# Patient Record
Sex: FEMALE | Race: WHITE | NOT HISPANIC OR LATINO | Employment: FULL TIME | URBAN - METROPOLITAN AREA
[De-identification: names, ages, dates, MRNs, and addresses within clinical notes are randomized per-mention and may not be internally consistent; named-entity substitution may affect disease eponyms.]

---

## 2020-01-13 ENCOUNTER — OFFICE VISIT (OUTPATIENT)
Dept: FAMILY MEDICINE CLINIC | Facility: CLINIC | Age: 30
End: 2020-01-13

## 2020-01-13 VITALS
WEIGHT: 146 LBS | HEIGHT: 63 IN | HEART RATE: 78 BPM | SYSTOLIC BLOOD PRESSURE: 118 MMHG | BODY MASS INDEX: 25.87 KG/M2 | TEMPERATURE: 98 F | DIASTOLIC BLOOD PRESSURE: 84 MMHG | RESPIRATION RATE: 18 BRPM

## 2020-01-13 DIAGNOSIS — M67.431 GANGLION CYST OF DORSUM OF RIGHT WRIST: ICD-10-CM

## 2020-01-13 DIAGNOSIS — R51.9 CHRONIC DAILY HEADACHE: Primary | ICD-10-CM

## 2020-01-13 PROCEDURE — 99204 OFFICE O/P NEW MOD 45 MIN: CPT | Performed by: FAMILY MEDICINE

## 2020-01-13 RX ORDER — PROPRANOLOL HYDROCHLORIDE 80 MG/1
80 CAPSULE, EXTENDED RELEASE ORAL DAILY
Qty: 30 CAPSULE | Refills: 2 | Status: SHIPPED | OUTPATIENT
Start: 2020-01-13 | End: 2020-02-13 | Stop reason: SDUPTHER

## 2020-01-13 NOTE — PROGRESS NOTES
Subjective   Nannette Thompson is a 29 y.o. female.     History of Present Illness     She complains of daily headaches for the last few months  She has tried various diets without help  Using tylenol and ibuprofen on a consistent basis  CALDWELL is always back of her head but can be entire head, radiates towards the front  Pain is an aching tightness, not a sharp pain  medicine does dull this but then it returns  Nothing makes this better nor worse  HA is just there all the time  Glasses are 6 months old, she is on computers all the time at work      She has a ganglion cyst that was removed one year ago but it is back and she would like to see some one else  Saw Dr. Barroso for this at [facility] last year, he did surgery but the cyst has returned  Since this is back she would like a second opinion  Is is somewhat sore but more unsightly than anything      The following portions of the patient's history were reviewed and updated as appropriate: allergies, current medications, past family history, past medical history, past social history, past surgical history and problem list.    Review of Systems   Constitutional: Negative.  Negative for fatigue.   Eyes: Negative.  Negative for photophobia and visual disturbance.   Respiratory: Negative.  Negative for shortness of breath.    Cardiovascular: Negative.  Negative for chest pain and palpitations.   Gastrointestinal: Negative.  Negative for nausea and vomiting.   Musculoskeletal: Negative.    Skin: Negative.    Neurological: Positive for headaches.   Psychiatric/Behavioral: Negative.  Negative for dysphoric mood. The patient is not nervous/anxious.    All other systems reviewed and are negative.      Objective   Physical Exam   Constitutional: She is oriented to person, place, and time. She appears well-developed and well-nourished. No distress.   HENT:   Head: Normocephalic and atraumatic.   Right Ear: Tympanic membrane, external ear and ear canal normal.   Left Ear: Tympanic  membrane, external ear and ear canal normal.   Nose: Nose normal.   Mouth/Throat: Uvula is midline and oropharynx is clear and moist.   Eyes: Pupils are equal, round, and reactive to light. Conjunctivae and EOM are normal.   Neck: Normal range of motion. Neck supple. No thyromegaly present.   Cardiovascular: Normal rate, regular rhythm and normal heart sounds.   No murmur heard.  Pulmonary/Chest: Effort normal and breath sounds normal. No respiratory distress.   Abdominal: Soft. Bowel sounds are normal. She exhibits no distension and no mass. There is no tenderness.   Lymphadenopathy:     She has no cervical adenopathy.   Neurological: She is alert and oriented to person, place, and time.   Skin: Skin is warm and dry.   Psychiatric: She has a normal mood and affect. Her behavior is normal. Judgment and thought content normal.   Nursing note and vitals reviewed.      Assessment/Plan   Nannette was seen today for establish care.    Diagnoses and all orders for this visit:    Chronic daily headache  -     MRI Brain Without Contrast; Future  -     propranolol LA (INDERAL LA) 80 MG 24 hr capsule; Take 1 capsule by mouth Daily.    Ganglion cyst of dorsum of right wrist  -     Ambulatory Referral to Hand Surgery    new onset daily HA for the last 3 months.  Will check MRI.  Will treat with inderal LA 80 once a day.  She will keep HA diary and f/u in one month to review.  Discussed multiple options for this and discussed other treatment modalities including chiropractor, massage, PT or neuro evaluation.  Will review in one month and will discuss MRI when it is completed    She is still bothered by the ganglion cyst, would like to get second opinion on this, referral made to [facility].

## 2020-01-14 ENCOUNTER — HOSPITAL ENCOUNTER (OUTPATIENT)
Dept: MRI IMAGING | Facility: HOSPITAL | Age: 30
Discharge: HOME | End: 2020-01-14
Admitting: FAMILY MEDICINE

## 2020-01-14 DIAGNOSIS — R51.9 CHRONIC DAILY HEADACHE: ICD-10-CM

## 2020-01-14 PROCEDURE — 70551 MRI BRAIN STEM W/O DYE: CPT

## 2020-02-13 ENCOUNTER — TELEPHONE (OUTPATIENT)
Dept: FAMILY MEDICINE CLINIC | Facility: CLINIC | Age: 30
End: 2020-02-13

## 2020-02-13 DIAGNOSIS — R51.9 CHRONIC DAILY HEADACHE: ICD-10-CM

## 2020-02-13 RX ORDER — PROPRANOLOL HYDROCHLORIDE 120 MG/1
120 CAPSULE, EXTENDED RELEASE ORAL DAILY
Qty: 30 CAPSULE | Refills: 0 | Status: SHIPPED | OUTPATIENT
Start: 2020-02-13 | End: 2020-02-19 | Stop reason: SDUPTHER

## 2020-02-13 NOTE — TELEPHONE ENCOUNTER
Pt's  because pt is stating that the BP medication (propranolol LA (INDERAL LA) 80 MG 24 hr capsule) she is taking is not helping the headaches     Would like to know if stronger prescription could be called in     [facility] [address]  Ph: [phone]  Fax: [phone]

## 2020-02-19 ENCOUNTER — OFFICE VISIT (OUTPATIENT)
Dept: FAMILY MEDICINE CLINIC | Facility: CLINIC | Age: 30
End: 2020-02-19

## 2020-02-19 VITALS
HEART RATE: 66 BPM | RESPIRATION RATE: 16 BRPM | BODY MASS INDEX: 27.11 KG/M2 | SYSTOLIC BLOOD PRESSURE: 118 MMHG | DIASTOLIC BLOOD PRESSURE: 72 MMHG | TEMPERATURE: 98.7 F | HEIGHT: 63 IN | WEIGHT: 153 LBS

## 2020-02-19 DIAGNOSIS — R51.9 CHRONIC DAILY HEADACHE: Primary | ICD-10-CM

## 2020-02-19 PROCEDURE — 99213 OFFICE O/P EST LOW 20 MIN: CPT | Performed by: FAMILY MEDICINE

## 2020-02-19 RX ORDER — SUMATRIPTAN 100 MG/1
TABLET, FILM COATED ORAL
Qty: 9 TABLET | Refills: 2 | Status: SHIPPED | OUTPATIENT
Start: 2020-02-19 | End: 2020-11-16

## 2020-02-19 RX ORDER — PROPRANOLOL HYDROCHLORIDE 120 MG/1
120 CAPSULE, EXTENDED RELEASE ORAL DAILY
Qty: 30 CAPSULE | Refills: 5 | Status: SHIPPED | OUTPATIENT
Start: 2020-02-19 | End: 2020-09-28 | Stop reason: SDUPTHER

## 2020-02-19 NOTE — PROGRESS NOTES
Subjective   Nannette Thompson is a 29 y.o. female.     History of Present Illness     Migraines are doing better at this time on the inderal  120 was better than the 80  Has only been on the 120 for about 1 week but has noted a nice im,provement in HA  HAs she does have are less intense    Reviewed normal MRI with pt, no worrisome findings noted      Review of Systems   Constitutional: Negative.        Objective   Physical Exam   Constitutional: She appears well-developed and well-nourished. No distress.   Cardiovascular: Normal rate, regular rhythm and normal heart sounds.   Pulmonary/Chest: Effort normal and breath sounds normal.   Psychiatric: She has a normal mood and affect. Her behavior is normal. Judgment and thought content normal.   Nursing note and vitals reviewed.      Assessment/Plan   Nannette was seen today for follow-up.    Diagnoses and all orders for this visit:    Chronic daily headache  -     SUMAtriptan (IMITREX) 100 MG tablet; Take one tablet at onset of headache. May repeat dose one time in 2 hours if headache not relieved.  -     propranolol LA (INDERAL LA) 120 MG 24 hr capsule; Take 1 capsule by mouth Daily.    we have seen a nice decrease in HA frequency with the inderal.  120 is better than the 80.  Ok for OTC tylenol, ibuprofen or exedrin migraine as needed and will give imitrex for severe HA in the future.  Recheck in 6 months or sooner with any problems. Pt agrees

## 2020-03-05 ENCOUNTER — TELEPHONE (OUTPATIENT)
Dept: FAMILY MEDICINE CLINIC | Facility: CLINIC | Age: 30
End: 2020-03-05

## 2020-03-05 RX ORDER — OSELTAMIVIR PHOSPHATE 75 MG/1
75 CAPSULE ORAL DAILY
Qty: 10 CAPSULE | Refills: 0 | Status: SHIPPED | OUTPATIENT
Start: 2020-03-05 | End: 2020-03-15

## 2020-03-05 NOTE — TELEPHONE ENCOUNTER
PT CALLED AND STATED THAT HER SON TESTED POSITIVE FOR THE FLU TODAY.  PT IS REQUESTING TAMILFLU.  SONS ONSET WAS LAST EVENING 3/4/20    PT CALL BACK [phone]

## 2020-09-28 ENCOUNTER — TELEPHONE (OUTPATIENT)
Dept: FAMILY MEDICINE CLINIC | Facility: CLINIC | Age: 30
End: 2020-09-28

## 2020-09-28 DIAGNOSIS — R51.9 CHRONIC DAILY HEADACHE: ICD-10-CM

## 2020-09-28 RX ORDER — PROPRANOLOL HYDROCHLORIDE 120 MG/1
120 CAPSULE, EXTENDED RELEASE ORAL DAILY
Qty: 30 CAPSULE | Refills: 0 | Status: SHIPPED | OUTPATIENT
Start: 2020-09-28 | End: 2020-10-12

## 2020-09-28 RX ORDER — PROPRANOLOL HYDROCHLORIDE 120 MG/1
120 CAPSULE, EXTENDED RELEASE ORAL DAILY
Qty: 30 CAPSULE | Refills: 0 | Status: SHIPPED | OUTPATIENT
Start: 2020-09-28 | End: 2020-09-28 | Stop reason: SDUPTHER

## 2020-09-28 RX ORDER — PROPRANOLOL HYDROCHLORIDE 120 MG/1
CAPSULE, EXTENDED RELEASE ORAL
Qty: 30 CAPSULE | Refills: 0 | OUTPATIENT
Start: 2020-09-28

## 2020-09-28 NOTE — TELEPHONE ENCOUNTER
----- Message from Nannette Thompson sent at 9/28/2020  3:02 PM EDT -----  Regarding: Prescription Question  Contact: [phone]  Hello,   I am on my last pill of propranolol today and am out of refills.   I made an appointment with you on Monday.   Is it possible to get a small refill just to get me through to Monday?   Thank you!

## 2020-10-12 ENCOUNTER — OFFICE VISIT (OUTPATIENT)
Dept: FAMILY MEDICINE CLINIC | Facility: CLINIC | Age: 30
End: 2020-10-12

## 2020-10-12 VITALS
DIASTOLIC BLOOD PRESSURE: 68 MMHG | BODY MASS INDEX: 27.46 KG/M2 | RESPIRATION RATE: 18 BRPM | TEMPERATURE: 97.7 F | WEIGHT: 155 LBS | HEIGHT: 63 IN | HEART RATE: 74 BPM | SYSTOLIC BLOOD PRESSURE: 110 MMHG

## 2020-10-12 DIAGNOSIS — G43.009 MIGRAINE WITHOUT AURA AND WITHOUT STATUS MIGRAINOSUS, NOT INTRACTABLE: Primary | ICD-10-CM

## 2020-10-12 DIAGNOSIS — R51.9 CHRONIC DAILY HEADACHE: ICD-10-CM

## 2020-10-12 PROCEDURE — 99214 OFFICE O/P EST MOD 30 MIN: CPT | Performed by: FAMILY MEDICINE

## 2020-10-12 NOTE — PROGRESS NOTES
Subjective   Nannette Thompson is a 29 y.o. female.     History of Present Illness     She notes her HA are getting worse again  Happening more often and intensity is getting worse  She has been on inderal for a while but it is just not working any more  This is now happening most days and interfering with life  HA are frontal and back of head  do not rodiate  No aura noted with HA  imitrex did not work for her migraines and she does not think this every worked well  She simply felt hot while taking the imitrex and did not like that sensation    She would like something new to help prevent HAs and to take when they occur.    The following portions of the patient's history were reviewed and updated as appropriate: allergies, current medications, past family history, past medical history, past social history, past surgical history and problem list.    Review of Systems   Constitutional: Negative.    Eyes: Negative.  Negative for visual disturbance.   Respiratory: Negative.  Negative for shortness of breath.    Cardiovascular: Negative.  Negative for chest pain.   Gastrointestinal: Negative.  Negative for constipation, diarrhea, nausea and vomiting.   Musculoskeletal: Negative.    Skin: Negative.    Neurological: Positive for headaches.   Psychiatric/Behavioral: Negative.  Negative for dysphoric mood. The patient is not nervous/anxious.    All other systems reviewed and are negative.      Objective   Physical Exam  Vitals signs and nursing note reviewed.   Constitutional:       General: She is not in acute distress.     Appearance: Normal appearance. She is well-developed.   Eyes:      Extraocular Movements: Extraocular movements intact.      Conjunctiva/sclera: Conjunctivae normal.   Cardiovascular:      Rate and Rhythm: Normal rate and regular rhythm.      Heart sounds: Normal heart sounds.   Pulmonary:      Effort: Pulmonary effort is normal.      Breath sounds: Normal breath sounds.   Neurological:      Mental  Status: She is alert and oriented to person, place, and time.   Psychiatric:         Mood and Affect: Mood normal.         Behavior: Behavior normal.         Thought Content: Thought content normal.         Judgment: Judgment normal.         Assessment/Plan   Nannette was seen today for daily headaches.    Diagnoses and all orders for this visit:    Migraine without aura and without status migrainosus, not intractable    Chronic daily headache    HA worse, more numerous, more frequent  Sample aimovig (as a preventative medicine) with instructions and ubrelvy 100 mg to try for abortive.   Will stop propranolol (discussed tachycardia that can sometimes occur with cessation of beta blockers, and she will call with any issues)  Will f/u in one month

## 2020-11-16 ENCOUNTER — OFFICE VISIT (OUTPATIENT)
Dept: FAMILY MEDICINE CLINIC | Facility: CLINIC | Age: 30
End: 2020-11-16

## 2020-11-16 VITALS
HEART RATE: 76 BPM | WEIGHT: 155 LBS | BODY MASS INDEX: 27.46 KG/M2 | RESPIRATION RATE: 18 BRPM | TEMPERATURE: 97.8 F | SYSTOLIC BLOOD PRESSURE: 114 MMHG | DIASTOLIC BLOOD PRESSURE: 72 MMHG | HEIGHT: 63 IN

## 2020-11-16 DIAGNOSIS — G43.009 MIGRAINE WITHOUT AURA AND WITHOUT STATUS MIGRAINOSUS, NOT INTRACTABLE: Primary | ICD-10-CM

## 2020-11-16 DIAGNOSIS — R63.5 WEIGHT GAIN: ICD-10-CM

## 2020-11-16 PROCEDURE — 99213 OFFICE O/P EST LOW 20 MIN: CPT | Performed by: FAMILY MEDICINE

## 2020-11-16 RX ORDER — ERENUMAB-AOOE 140 MG/ML
140 INJECTION, SOLUTION SUBCUTANEOUS
Qty: 1 ML | Refills: 5 | Status: SHIPPED | OUTPATIENT
Start: 2020-11-16 | End: 2021-05-19 | Stop reason: SDUPTHER

## 2020-11-16 RX ORDER — PHENTERMINE HYDROCHLORIDE 37.5 MG/1
37.5 TABLET ORAL
Qty: 30 TABLET | Refills: 1 | Status: SHIPPED | OUTPATIENT
Start: 2020-11-16 | End: 2021-04-20

## 2020-11-16 NOTE — PROGRESS NOTES
Subjective   Nannette Thompson is a 30 y.o. female.     History of Present Illness     She had tried aimovig and really liked this medicine  It has greatly helped her migraines  ubrelvy helped when it was used  Has not had to use any medicine the last 3 weeks though!!!      She is staying in the  and her BMI was needing to be 22 or less  She would like referral for weight loss  [employer]  She is exercising but just cannot lose weight    The following portions of the patient's history were reviewed and updated as appropriate: allergies, current medications, past family history, past medical history, past social history, past surgical history and problem list.    Review of Systems   Constitutional: Negative.    HENT: Negative.    Eyes: Negative.    Respiratory: Negative.  Negative for shortness of breath.    Cardiovascular: Negative.  Negative for chest pain.   Gastrointestinal: Negative.    Musculoskeletal: Negative.    Skin: Negative.    Neurological: Negative.    Psychiatric/Behavioral: Negative.  Negative for dysphoric mood. The patient is not nervous/anxious.    All other systems reviewed and are negative.      Objective   Physical Exam  Vitals signs and nursing note reviewed.   Constitutional:       General: She is not in acute distress.     Appearance: Normal appearance. She is well-developed.   Cardiovascular:      Rate and Rhythm: Normal rate and regular rhythm.      Heart sounds: Normal heart sounds.   Pulmonary:      Effort: Pulmonary effort is normal.      Breath sounds: Normal breath sounds.   Neurological:      Mental Status: She is alert and oriented to person, place, and time.   Psychiatric:         Mood and Affect: Mood normal.         Behavior: Behavior normal.         Thought Content: Thought content normal.         Judgment: Judgment normal.         Assessment/Plan   Diagnoses and all orders for this visit:    1. Migraine without aura and without status migrainosus,  not intractable (Primary)  -     Erenumab-aooe (Aimovig) 140 MG/ML prefilled syringe; Inject 1 mL under the skin into the appropriate area as directed Every 30 (Thirty) Days.  Dispense: 1 mL; Refill: 5  -     ubrogepant (ubrogepant) 100 MG tablet; 1 at onset of migraine, can repeat in 2 hours if needed  Dispense: 10 tablet; Refill: 5    2. Weight gain  -     phentermine (ADIPEX-P) 37.5 MG tablet; Take 1 tablet by mouth Every Morning Before Breakfast.  Dispense: 30 tablet; Refill: 1    aimovig working great for her migraines, amazing control  Will continue this    Ok to try phentermine for weight.  She notes her [employer] weight rules are nonsensical to me as she is consider overweight and needs a BMI of 22!

## 2020-11-24 ENCOUNTER — HOSPITAL ENCOUNTER (OUTPATIENT)
Dept: NUTRITION | Facility: HOSPITAL | Age: 30
Setting detail: RECURRING SERIES
Discharge: HOME | End: 2020-11-24

## 2020-11-24 VITALS — HEIGHT: 63 IN | BODY MASS INDEX: 27.46 KG/M2 | WEIGHT: 155 LBS

## 2020-11-24 PROCEDURE — 97802 MEDICAL NUTRITION INDIV IN: CPT | Performed by: DIETITIAN, REGISTERED

## 2020-11-24 NOTE — CONSULTS
"Adult Outpatient Nutrition  Assessment/PES    Patient Name:  Nannette Thompson  YOB: 1990  MRN: [id number]    Assessment Date:  11/24/2020    Telehealth nutrition consult, 60 minutes. This medical referred consult was provided as a telephone call, tele-health or e-visit, as patient is unable to attend an in-office appointment due to the COVID-19 crisis. Consent for treatment was given verbally.    Comments:  Patient present for nutrition counseling related to weight loss. Patient has a desire to lose weight to meet  regulations. States she must be able to pass various physical fitness tests (2 mile run, push ups, sit ups, deadlifts, etc) and maintain a BMI of \"22 or 23\". Patient is frustrated as she has been making weight loss attempts without success. Reports adding in more exercise, but feels this only causes her to gain weight because she \"gains muscle very quickly\". Patient has done the ketogenic diet and lost weight but found she could not sustain exercise. Reports she does try and limit carbohydrates because her son is T1DM. Currently patient skips breakfast most days, eats out for lunch, and has a dinner at home. Drinks water and has cut back to 1 energy drink per day. Her exercise routine is running 1-3 times/week and some strength exercises. Her lifestyle is otherwise sedentary. Patient wants to obtain information today on strategies for weight loss. Current stated weight is 70.3kg (155 lbs). Patient has no barriers to learning. Health literacy assessment not completed today.    The instructional process includes the plate method, meal planning, portions, food record keeping, and exercise. Estimated energy needs for weight loss is 1,300 calories per day. Patient has never tracked calories and is open to trying this. RD advised a regular eating pattern and the benefits of not skipping meals. We looked at a 1,300 calorie meal plan and discussed how to create meals using food groups " "and serving sizes. Patient plans to pack ahead more to try and include breakfast more regularly, and cut back on her eating out for lunch. RD suggested patient keep items at work to reduce amount of prep work (leave oatmeal packets, protein bars, fresh fruit, salad kits, etc at work), and patient is open to this idea. RD also demonstrated ADA and AHA websites to patient and will send this to help patient with finding healthy meal ideas. Overall patient is receptive to the information and willing to try suggestions provided. Will address macronutrients, additional meal planning, and exercise in more detail at the follow up appointment.     Consent given to e-mail materials, including [facility] Weight Loss Toolkit, 1,300 kcal meal plan and sample menu, and supporting nutrition education materials.     Goals:  1. Follow 1,300 calorie plan/track calories.   2. Lose weight, 1+ kg per month.     Total of 50 minutes spent with patient on nutrition counseling. Education based on Academy of Dietetics and Nutrition guidelines. Patient was provided with RD's contact information. Follow up visit is scheduled for 12/14 at 2:00 p.m. Thank you for this referral.    General Info     Row Name 11/24/20 1414       Today's Session    Person(s) attending today's session  Patient     Services Used Today?  No       General Information    How Well Do You Speak English?  very well    Do You Speak a Language Other Than English at Home?  no    Preferred Language  English    Are you able to read and write English?  Yes    Lives With  child(francisco), dependent;spouse    Is patient pregnant?  no        Physical Findings     Row Name 11/24/20 1420          Physical Findings    Overall Physical Appearance  overweight         Anthropometrics     Row Name 11/24/20 1420          Anthropometrics    Height  160 cm (63\")     Weight  70.3 kg (155 lb)        Ideal Body Weight (IBW)    Ideal Body Weight (IBW) (kg)  52.72     % Ideal Body Weight "  133.37        Body Mass Index (BMI)    BMI (kg/m2)  27.51         Nutritional Info/Activity     Row Name 11/24/20 1800       Nutritional Information    Have you had weight changes?  No    What is your desired body weight?  -- BMI of 22-23    Have you tried to lose weight before?  Yes    List programs tried, date, and success  More exercise/weight lifting - no success; keto - lost weight but no energy for exercise    What is your motivation to lose weight?  Pass  regulations    Supplemental Drinks/Foods/Additives  None    History of eating disorder?  No    What cultural diet influences are important for you to follow?  None    Do you have difficulty chewing food?  No    Functional Status  able to prepare meals;able to purchase food;ambulatory    How often during the day do you find yourself snacking?  0-1 time/day (not often)    How often do you eat out and where?  daily for lunch, 1 time/week for dinner    How many times do you drink milk per day?  0    How many times do you eat fruit per day?  0    How many times do you eat vegetables per day?  2    How many times do you drink juice per day?  0    How many times do you eat candy/chocolates per day?  0    How many times do you eat baked goods per day?  0    How many times do you eat desserts per day?  0    How many times do you eat ice cream per day?  0    How many times do you eat snack foods per day?  0    How many diet sodas do you drink per day?  0    How many regular sodas do you drink per day?  0    How many times do you eat ethnic food per day?  0    How many times do you drink alcohol per day?  0    How many times do you have caffeine per day?  1    How many servings of artificial sweetner do you have per day?  0    How many meals do you eat each day?  2    How many snacks do you eat each day?  0    What is the biggest challenge you have with your diet?  Knowledge;Other (comment) Not losing weight despite diet attempts    Enter everything you can  "remember eating in the last 24 hours (1 day)  Breakfast: Diet energy drink; Lunch: Syriac food (takout); Dinner: Meat, vegetable       Eating Environment    Eating environment  Family;Work       Physical Activity    Are you currently involved in an activity/exercise program?   Yes    Describe physical activity  Running 2-3 times/week; Some strength (push-ups, sit-ups)    How many minutes do you spend on exercise each day?  0 0-30        Home Nutrition Report     Row Name 11/24/20 1420          Home Nutrition Report    Supplemental Drinks/Foods/Additives  None         Estimated/Assessed Needs     Row Name 11/24/20 1420          Calculation Measurements    Weight Used For Calculations  70.3 kg (155 lb)     Height  160 cm (63\")        Estimated/Assessed Needs    Additional Documentation  Dalton-St. Jeor Equation (Group)        Dalton-St. Jeor Equation    RMR (Dalton-St. Jeor Equation)  1392.205     Dalton-St. Jeor Activity Factors  1.2     Activity Factors (Dalton-St. Jeor)  1670.646                 Problem/Interventions:  Problem 1     Row Name 11/24/20 1429          Nutrition Diagnoses Problem 1    Problem 1  Overweight/Obesity     Etiology (related to)  Factors Affecting Nutrition     Food Habit/Preferences  Other skipped meal, fast food daily, possible over consumption of calories     Signs/Symptoms (evidenced by)  BMI     BMI  25 - 29.9                 Intervention Goal     Row Name 11/24/20 1429          Intervention Goal    General  Meet nutritional needs for age/condition     PO  Meet estimated needs     Weight  Appropriate weight loss           Nutrition Prescription     Row Name 11/24/20 1430          Nutrition Prescription PO    PO Prescription  Begin/change diet     Begin/Change Diet to  Regular     Fluid Consistency  Thin     New PO Prescription Ordered?  No, recommended         Education/Evaluation     Row Name 11/24/20 1431          Education    Education  Education topics;Provided education " regarding;Advised regarding habits/behavior     Provided education regarding  Nutrition for age;Diet rationale     Education Topics  Basic nutrition;Calorie counting;Gradual weight loss     Kcal/Day  1300 Kcal/day     Advised Regarding Habits/Behavior  Eating pattern;Food choices;Meal planning        Monitor/Evaluation    Monitor  Per protocol     Education Follow-up  Other (comment) 12/14 at 2:00 p.m.           Electronically signed by:  Areli Sun RD  11/24/20 14:34 EST

## 2020-12-14 ENCOUNTER — APPOINTMENT (OUTPATIENT)
Dept: NUTRITION | Facility: HOSPITAL | Age: 30
End: 2020-12-14

## 2021-01-04 ENCOUNTER — HOSPITAL ENCOUNTER (OUTPATIENT)
Dept: NUTRITION | Facility: HOSPITAL | Age: 31
Setting detail: RECURRING SERIES
Discharge: HOME | End: 2021-01-04

## 2021-01-04 NOTE — PROGRESS NOTES
Adult Outpatient Nutrition  Assessment/PES    Patient Name:  Nannette Thompson  YOB: 1990  MRN: [id number]    Assessment Date:  1/4/2021    Telehealth nutrition consult, 30 minutes. This medical referred consult was provided as a telephone call, tele-health or e-visit, as patient is unable to attend an in-office appointment due to the COVID-19 crisis. Consent for treatment was given verbally.    Comments: Patient present for nutrition follow up appointment related to weight loss. Patient is trying to lose weight to meet her  regulations and pass the PT test. Patient does not report a current weight as she feels it is fluctuating too much to know if she has had any weight changes. States it has fluctuated from 145 to 153lbs in 24 hours. Since the initial nutrition appointment, patient says she has been more mindful about her calorie intake. She is not using an abhi to track, but is reading labels to help make switches to lower calorie items, and feels she is closer to 1,300 calories per day. Patient's son was also just diagnosed with hypercholesterolemia so she has been trying to cut back on higher fat and cholesterol foods. She hopes this will help her lose weight as well. She feels her biggest success has been cooking more at home. Patient was eating out for lunch most days but now says she has hardly eaten out or used door dash since our initial visit. Patient feels her challenges have been a busy schedule leading to poor appetite and lack of exercise, and consistency with her healthier food choices. On days when her schedule is busy she may not eat a meal until dinner and then snack later in the evening. RD suggested incorporating a protein shake or smoothie earlier in the day to prevent 1 large meal at night. We also discussed creating a weekly plan/schedule and using phone alarms or reminders for her water consumption. Because of patient's large weight fluctuations, RD questions salt  "and fluid intake. Patient admits she \"adds salt to everything\" and some days may not drink any water. RD advised cutting back on the salt intake and adequate hydration to prevent the larger fluctuations in water weight. Overall patient states she \"knows what to do\" and needs to work on more consistency with her meal pattern and food choices, and adding exercise back in (she has a PT test coming up). Denies any questions or concerns today. RD encouraged patient with her goals she has set.     Goal completion:  1. Follow 1,300 calorie plan/track calories: 75%   2. Lose weight, 1+ kg per month: 0% Patient reports no significant weight changes.     Total of 25 minutes spent counseling with patient. She is encouraged to contact RD as needed. Thank you again for this referral.     General Info     Row Name 01/04/21 1107       Today's Session    Person(s) attending today's session  Patient     Services Used Today?  No       General Information    How Well Do You Speak English?  very well    Do You Speak a Language Other Than English at Home?  no    Preferred Language  English    Are you able to read and write English?  Yes    Lives With  spouse;child(francisco), dependent    Is patient pregnant?  no        Nutritional Info/Activity     Row Name 01/04/21 1103       Nutritional Information    Have you had weight changes?  Yes    Describe weight changes  Weight fluctuations - most recent 145-153 pounds    What is the biggest challenge you have with your diet?  Other (comment) consistency    Enter everything you can remember eating in the last 24 hours (1 day)  Breakfast: Skip; Lunch: Salad; Dinner: Bowl of pasta       Eating Environment    Eating environment  Family;Work       Physical Activity    Are you currently involved in an activity/exercise program?   No        Electronically signed by:  Areli Sun RD  01/04/21 11:05 EST  "

## 2021-04-20 ENCOUNTER — OFFICE VISIT (OUTPATIENT)
Dept: FAMILY MEDICINE CLINIC | Facility: CLINIC | Age: 31
End: 2021-04-20

## 2021-04-20 VITALS
DIASTOLIC BLOOD PRESSURE: 82 MMHG | SYSTOLIC BLOOD PRESSURE: 120 MMHG | RESPIRATION RATE: 18 BRPM | TEMPERATURE: 99.1 F | HEIGHT: 63 IN | BODY MASS INDEX: 24.8 KG/M2 | WEIGHT: 140 LBS | HEART RATE: 76 BPM

## 2021-04-20 DIAGNOSIS — N92.1 MENOMETRORRHAGIA: Primary | ICD-10-CM

## 2021-04-20 PROCEDURE — 99213 OFFICE O/P EST LOW 20 MIN: CPT | Performed by: FAMILY MEDICINE

## 2021-04-20 NOTE — PROGRESS NOTES
Subjective   Nannette Thompson is a 30 y.o. female.     History of Present Illness     She has had irregular cycles this month  In the past had always been normal  He had 3 breaks in the bleeding but otherwise has continued to bleed 5 days and then stop  She is bleeding right now  No abdominal pain, no cramping  This has never happened before and has only been present this month    In the past when she tried OCP she has had a lot of depression and suicidalitry      Review of Systems   Genitourinary: Positive for menstrual problem and vaginal bleeding.       Objective   Physical Exam  Vitals and nursing note reviewed.   Constitutional:       General: She is not in acute distress.     Appearance: Normal appearance. She is well-developed.   Cardiovascular:      Rate and Rhythm: Normal rate and regular rhythm.      Heart sounds: Normal heart sounds.   Pulmonary:      Effort: Pulmonary effort is normal.      Breath sounds: Normal breath sounds.   Neurological:      Mental Status: She is alert and oriented to person, place, and time.   Psychiatric:         Mood and Affect: Mood normal.         Behavior: Behavior normal.         Thought Content: Thought content normal.         Judgment: Judgment normal.         Assessment/Plan   Diagnoses and all orders for this visit:    1. Menometrorrhagia (Primary)  -     CBC & Differential  -     Comprehensive Metabolic Panel  -     TSH+Free T4  -     FSH & LH  -     Estrogens, Total  -     Progesterone    will check hormones and f/u INB.  this is first month for this irregularity in her cycles.  She has had very bad reactions to OCP in the past, will avoid these but if bleeding continues and no cause found.  Pt aware she will need to see gyn for work up.

## 2021-04-22 ENCOUNTER — TELEPHONE (OUTPATIENT)
Dept: FAMILY MEDICINE CLINIC | Facility: CLINIC | Age: 31
End: 2021-04-22

## 2021-04-22 NOTE — TELEPHONE ENCOUNTER
Caller: Nannette Thompson    Relationship to patient: Self    Best call back number: [phone]     Patient is needing: PATIENT REQUESTING CALL BACK TO GO OVER RECENT LAB RESULTS.

## 2021-04-26 LAB
ALBUMIN SERPL-MCNC: 4.5 G/DL (ref 3.9–5)
ALBUMIN/GLOB SERPL: 1.9 {RATIO} (ref 1.2–2.2)
ALP SERPL-CCNC: 53 IU/L (ref 39–117)
ALT SERPL-CCNC: 11 IU/L (ref 0–32)
AST SERPL-CCNC: 13 IU/L (ref 0–40)
BASOPHILS # BLD AUTO: 0 X10E3/UL (ref 0–0.2)
BASOPHILS NFR BLD AUTO: 1 %
BILIRUB SERPL-MCNC: 0.4 MG/DL (ref 0–1.2)
BUN SERPL-MCNC: 8 MG/DL (ref 6–20)
BUN/CREAT SERPL: 13 (ref 9–23)
CALCIUM SERPL-MCNC: 9.4 MG/DL (ref 8.7–10.2)
CHLORIDE SERPL-SCNC: 106 MMOL/L (ref 96–106)
CO2 SERPL-SCNC: 22 MMOL/L (ref 20–29)
CREAT SERPL-MCNC: 0.63 MG/DL (ref 0.57–1)
EOSINOPHIL # BLD AUTO: 0.1 X10E3/UL (ref 0–0.4)
EOSINOPHIL NFR BLD AUTO: 2 %
ERYTHROCYTE [DISTWIDTH] IN BLOOD BY AUTOMATED COUNT: 12 % (ref 11.7–15.4)
ESTROGEN SERPL-MCNC: 182 PG/ML
FSH SERPL-ACNC: 6.3 MIU/ML
GLOBULIN SER CALC-MCNC: 2.4 G/DL (ref 1.5–4.5)
GLUCOSE SERPL-MCNC: 91 MG/DL (ref 65–99)
HCT VFR BLD AUTO: 38.9 % (ref 34–46.6)
HGB BLD-MCNC: 13 G/DL (ref 11.1–15.9)
IMM GRANULOCYTES # BLD AUTO: 0 X10E3/UL (ref 0–0.1)
IMM GRANULOCYTES NFR BLD AUTO: 0 %
LH SERPL-ACNC: 11.3 MIU/ML
LYMPHOCYTES # BLD AUTO: 2.1 X10E3/UL (ref 0.7–3.1)
LYMPHOCYTES NFR BLD AUTO: 36 %
MCH RBC QN AUTO: 29.8 PG (ref 26.6–33)
MCHC RBC AUTO-ENTMCNC: 33.4 G/DL (ref 31.5–35.7)
MCV RBC AUTO: 89 FL (ref 79–97)
MONOCYTES # BLD AUTO: 0.4 X10E3/UL (ref 0.1–0.9)
MONOCYTES NFR BLD AUTO: 7 %
NEUTROPHILS # BLD AUTO: 3.2 X10E3/UL (ref 1.4–7)
NEUTROPHILS NFR BLD AUTO: 54 %
PLATELET # BLD AUTO: 389 X10E3/UL (ref 150–450)
POTASSIUM SERPL-SCNC: 4.6 MMOL/L (ref 3.5–5.2)
PROGEST SERPL-MCNC: 1 NG/ML
PROT SERPL-MCNC: 6.9 G/DL (ref 6–8.5)
RBC # BLD AUTO: 4.36 X10E6/UL (ref 3.77–5.28)
SODIUM SERPL-SCNC: 140 MMOL/L (ref 134–144)
T4 FREE SERPL-MCNC: 1.71 NG/DL (ref 0.82–1.77)
TSH SERPL DL<=0.005 MIU/L-ACNC: 0.36 UIU/ML (ref 0.45–4.5)
WBC # BLD AUTO: 5.9 X10E3/UL (ref 3.4–10.8)

## 2021-05-19 DIAGNOSIS — G43.009 MIGRAINE WITHOUT AURA AND WITHOUT STATUS MIGRAINOSUS, NOT INTRACTABLE: ICD-10-CM

## 2021-05-19 RX ORDER — ERENUMAB-AOOE 140 MG/ML
140 INJECTION, SOLUTION SUBCUTANEOUS
Qty: 1 ML | Refills: 5 | Status: SHIPPED | OUTPATIENT
Start: 2021-05-19 | End: 2021-11-22

## 2021-06-18 DIAGNOSIS — G43.009 MIGRAINE WITHOUT AURA AND WITHOUT STATUS MIGRAINOSUS, NOT INTRACTABLE: ICD-10-CM

## 2021-09-20 ENCOUNTER — OFFICE VISIT (OUTPATIENT)
Dept: FAMILY MEDICINE CLINIC | Facility: CLINIC | Age: 31
End: 2021-09-20

## 2021-09-20 ENCOUNTER — TELEPHONE (OUTPATIENT)
Dept: FAMILY MEDICINE CLINIC | Facility: CLINIC | Age: 31
End: 2021-09-20

## 2021-09-20 VITALS
WEIGHT: 140 LBS | HEIGHT: 63 IN | RESPIRATION RATE: 18 BRPM | DIASTOLIC BLOOD PRESSURE: 68 MMHG | SYSTOLIC BLOOD PRESSURE: 110 MMHG | BODY MASS INDEX: 24.8 KG/M2 | TEMPERATURE: 98 F | HEART RATE: 78 BPM

## 2021-09-20 DIAGNOSIS — E04.1 THYROID NODULE: ICD-10-CM

## 2021-09-20 DIAGNOSIS — E03.9 HYPOTHYROIDISM, UNSPECIFIED TYPE: Primary | ICD-10-CM

## 2021-09-20 PROCEDURE — 99213 OFFICE O/P EST LOW 20 MIN: CPT | Performed by: FAMILY MEDICINE

## 2021-09-20 NOTE — PROGRESS NOTES
Subjective   Nannette Thompson is a 30 y.o. female.     History of Present Illness     She has had some throat pain with swallowing as well as needing her thyroid labs to be rechecked  She had US and biopsy times 3 done in in the past   Pathology was benign in the past but not sure when this weas        Review of Systems   HENT: Positive for sore throat.        Objective   Physical Exam  Vitals and nursing note reviewed.   Constitutional:       General: She is not in acute distress.     Appearance: Normal appearance. She is well-developed.   Neck:      Thyroid: No thyroid mass, thyromegaly or thyroid tenderness.   Cardiovascular:      Rate and Rhythm: Normal rate and regular rhythm.      Heart sounds: Normal heart sounds.   Pulmonary:      Effort: Pulmonary effort is normal.      Breath sounds: Normal breath sounds.   Musculoskeletal:      Cervical back: Normal range of motion and neck supple.   Lymphadenopathy:      Cervical: No cervical adenopathy.   Neurological:      Mental Status: She is alert and oriented to person, place, and time.   Psychiatric:         Mood and Affect: Mood normal.         Behavior: Behavior normal.         Thought Content: Thought content normal.         Judgment: Judgment normal.         Assessment/Plan   Diagnoses and all orders for this visit:    1. Hypothyroidism, unspecified type (Primary)  -     TSH+Free T4  -     Thyroid Antibodies    2. Thyroid nodule    will check thyroid land and antibody testing.  F/u pending labs  We may need a thyroid US as she is not sure where/when her thyroid US was in the past.  She will call us back with providers name so we can request records

## 2021-09-21 LAB
T4 FREE SERPL-MCNC: 1.32 NG/DL (ref 0.82–1.77)
THYROGLOB AB SERPL-ACNC: <1 IU/ML (ref 0–0.9)
THYROPEROXIDASE AB SERPL-ACNC: <8 IU/ML (ref 0–34)
TSH SERPL DL<=0.005 MIU/L-ACNC: 0.71 UIU/ML (ref 0.45–4.5)

## 2021-10-21 ENCOUNTER — TELEPHONE (OUTPATIENT)
Dept: FAMILY MEDICINE CLINIC | Facility: CLINIC | Age: 31
End: 2021-10-21

## 2021-10-22 ENCOUNTER — TELEPHONE (OUTPATIENT)
Dept: FAMILY MEDICINE CLINIC | Facility: CLINIC | Age: 31
End: 2021-10-22

## 2021-10-22 NOTE — TELEPHONE ENCOUNTER
Erenumab-aooe (Aimovig) 140 MG/ML prefilled syringe is not covered under patient plan. It is a benefit exclusion.     Drug is not covered by plan

## 2021-11-22 DIAGNOSIS — G43.009 MIGRAINE WITHOUT AURA AND WITHOUT STATUS MIGRAINOSUS, NOT INTRACTABLE: ICD-10-CM

## 2021-11-22 RX ORDER — ERENUMAB-AOOE 140 MG/ML
INJECTION, SOLUTION SUBCUTANEOUS
Qty: 1 ML | Refills: 0 | Status: SHIPPED | OUTPATIENT
Start: 2021-11-22 | End: 2022-01-11

## 2021-12-07 ENCOUNTER — OFFICE VISIT (OUTPATIENT)
Dept: FAMILY MEDICINE CLINIC | Facility: CLINIC | Age: 31
End: 2021-12-07

## 2021-12-07 VITALS
BODY MASS INDEX: 27.29 KG/M2 | DIASTOLIC BLOOD PRESSURE: 80 MMHG | TEMPERATURE: 97.7 F | HEART RATE: 76 BPM | HEIGHT: 63 IN | WEIGHT: 154 LBS | RESPIRATION RATE: 18 BRPM | SYSTOLIC BLOOD PRESSURE: 118 MMHG

## 2021-12-07 DIAGNOSIS — R59.1 LYMPHADENOPATHY OF HEAD AND NECK: Primary | ICD-10-CM

## 2021-12-07 PROCEDURE — 99213 OFFICE O/P EST LOW 20 MIN: CPT | Performed by: FAMILY MEDICINE

## 2021-12-07 RX ORDER — AZITHROMYCIN 250 MG/1
TABLET, FILM COATED ORAL
Qty: 6 TABLET | Refills: 0 | Status: SHIPPED | OUTPATIENT
Start: 2021-12-07

## 2021-12-07 NOTE — PROGRESS NOTES
Subjective   Nannette Thompson is a 31 y.o. female.     History of Present Illness     For the past several months she has felt swelling on her left neck and left sore throat  Pain is along whole side of face and neck  No pain with swallowing  No fevers    The following portions of the patient's history were reviewed and updated as appropriate: allergies, current medications, past family history, past medical history, past social history, past surgical history and problem list.    Review of Systems   Constitutional: Negative.  Negative for fever and unexpected weight change.       Objective   Physical Exam  Vitals and nursing note reviewed.   Constitutional:       General: She is not in acute distress.     Appearance: Normal appearance. She is well-developed.   HENT:      Head: Normocephalic and atraumatic.      Right Ear: Hearing, tympanic membrane, ear canal and external ear normal.      Left Ear: Hearing, tympanic membrane, ear canal and external ear normal.      Nose: Nose normal.      Mouth/Throat:      Pharynx: Uvula midline.   Eyes:      Conjunctiva/sclera: Conjunctivae normal.   Cardiovascular:      Rate and Rhythm: Normal rate and regular rhythm.      Heart sounds: Normal heart sounds.   Pulmonary:      Effort: Pulmonary effort is normal.      Breath sounds: Normal breath sounds.   Musculoskeletal:      Cervical back: Normal range of motion.   Lymphadenopathy:      Cervical: Cervical adenopathy present.      Right cervical: No superficial, deep or posterior cervical adenopathy.     Left cervical: Superficial cervical adenopathy present. No deep or posterior cervical adenopathy.   Neurological:      Mental Status: She is alert and oriented to person, place, and time.   Psychiatric:         Mood and Affect: Mood normal.         Behavior: Behavior normal.         Thought Content: Thought content normal.         Judgment: Judgment normal.         Assessment/Plan   Diagnoses and all orders for this  visit:    1. Lymphadenopathy of head and neck (Primary)  -     CBC & Differential  -     Ambulatory Referral to ENT (Otolaryngology)  -     azithromycin (Zithromax) 250 MG tablet; Take 2 tablets the first day, then 1 tablet daily for 4 days.  Dispense: 6 tablet; Refill: 0    isolated but persistent anterior left cerv LA.  Will check CBC< broad spectrum antibiotics and get ENT involved for further evaluation.

## 2021-12-08 LAB
BASOPHILS # BLD AUTO: 0.1 X10E3/UL (ref 0–0.2)
BASOPHILS NFR BLD AUTO: 1 %
EOSINOPHIL # BLD AUTO: 0.2 X10E3/UL (ref 0–0.4)
EOSINOPHIL NFR BLD AUTO: 2 %
ERYTHROCYTE [DISTWIDTH] IN BLOOD BY AUTOMATED COUNT: 11.9 % (ref 11.7–15.4)
HCT VFR BLD AUTO: 39 % (ref 34–46.6)
HGB BLD-MCNC: 13.3 G/DL (ref 11.1–15.9)
IMM GRANULOCYTES # BLD AUTO: 0 X10E3/UL (ref 0–0.1)
IMM GRANULOCYTES NFR BLD AUTO: 0 %
LYMPHOCYTES # BLD AUTO: 2.7 X10E3/UL (ref 0.7–3.1)
LYMPHOCYTES NFR BLD AUTO: 35 %
MCH RBC QN AUTO: 29.9 PG (ref 26.6–33)
MCHC RBC AUTO-ENTMCNC: 34.1 G/DL (ref 31.5–35.7)
MCV RBC AUTO: 88 FL (ref 79–97)
MONOCYTES # BLD AUTO: 0.5 X10E3/UL (ref 0.1–0.9)
MONOCYTES NFR BLD AUTO: 7 %
NEUTROPHILS # BLD AUTO: 4.3 X10E3/UL (ref 1.4–7)
NEUTROPHILS NFR BLD AUTO: 55 %
PLATELET # BLD AUTO: 400 X10E3/UL (ref 150–450)
RBC # BLD AUTO: 4.45 X10E6/UL (ref 3.77–5.28)
WBC # BLD AUTO: 7.7 X10E3/UL (ref 3.4–10.8)

## 2021-12-21 ENCOUNTER — TRANSCRIBE ORDERS (OUTPATIENT)
Dept: ADMINISTRATIVE | Facility: HOSPITAL | Age: 31
End: 2021-12-21

## 2021-12-21 DIAGNOSIS — R22.1 LOCALIZED SWELLING, MASS AND LUMP, NECK: ICD-10-CM

## 2021-12-21 DIAGNOSIS — R22.1 LUMP IN NECK: ICD-10-CM

## 2021-12-21 DIAGNOSIS — R22.1 NECK MASS: Primary | ICD-10-CM

## 2021-12-29 ENCOUNTER — HOSPITAL ENCOUNTER (OUTPATIENT)
Dept: CT IMAGING | Facility: HOSPITAL | Age: 31
Discharge: HOME | End: 2021-12-29
Admitting: OTOLARYNGOLOGY

## 2021-12-29 DIAGNOSIS — R22.1 LOCALIZED SWELLING, MASS AND LUMP, NECK: ICD-10-CM

## 2021-12-29 PROCEDURE — 25010000002 IOPAMIDOL 61 % SOLUTION: Performed by: OTOLARYNGOLOGY

## 2021-12-29 PROCEDURE — 70491 CT SOFT TISSUE NECK W/DYE: CPT

## 2021-12-29 RX ADMIN — IOPAMIDOL 95 ML: 612 INJECTION, SOLUTION INTRAVENOUS at 12:02

## 2022-01-10 ENCOUNTER — TRANSCRIBE ORDERS (OUTPATIENT)
Dept: ADMINISTRATIVE | Facility: HOSPITAL | Age: 32
End: 2022-01-10

## 2022-01-10 DIAGNOSIS — R22.1 LOCALIZED SWELLING, MASS AND LUMP, NECK: Primary | ICD-10-CM

## 2022-01-11 DIAGNOSIS — G43.009 MIGRAINE WITHOUT AURA AND WITHOUT STATUS MIGRAINOSUS, NOT INTRACTABLE: ICD-10-CM

## 2022-01-11 RX ORDER — ERENUMAB-AOOE 140 MG/ML
INJECTION, SOLUTION SUBCUTANEOUS
Qty: 1 ML | Refills: 2 | Status: SHIPPED | OUTPATIENT
Start: 2022-01-11

## 2023-04-14 ENCOUNTER — OFFICE VISIT (OUTPATIENT)
Dept: FAMILY MEDICINE CLINIC | Facility: CLINIC | Age: 33
End: 2023-04-14
Payer: COMMERCIAL

## 2023-04-14 VITALS
WEIGHT: 156 LBS | SYSTOLIC BLOOD PRESSURE: 110 MMHG | HEIGHT: 63 IN | DIASTOLIC BLOOD PRESSURE: 74 MMHG | HEART RATE: 78 BPM | BODY MASS INDEX: 27.64 KG/M2 | TEMPERATURE: 97.8 F | RESPIRATION RATE: 18 BRPM

## 2023-04-14 DIAGNOSIS — Z71.84 TRAVEL ADVICE ENCOUNTER: Primary | ICD-10-CM

## 2023-04-14 RX ORDER — DOXYCYCLINE 100 MG/1
TABLET ORAL
Qty: 40 TABLET | Refills: 0 | Status: SHIPPED | OUTPATIENT
Start: 2023-04-14

## 2023-04-14 RX ORDER — ONDANSETRON 8 MG/1
8 TABLET, ORALLY DISINTEGRATING ORAL EVERY 8 HOURS PRN
Qty: 20 TABLET | Refills: 1 | Status: SHIPPED | OUTPATIENT
Start: 2023-04-14

## 2023-04-14 RX ORDER — TYPHOID VACC,LIVE,ATTENUATED 2B UNIT
1 CAPSULE,DELAYED RELEASE (ENTERIC COATED) ORAL EVERY OTHER DAY
Qty: 4 CAPSULE | Refills: 0 | Status: SHIPPED | OUTPATIENT
Start: 2023-04-14 | End: 2023-04-21

## 2023-04-14 NOTE — PROGRESS NOTES
Subjective   Nannette Thompson is a 32 y.o. female.     History of Present Illness     She is traveling to [facility] through [facility] [employer] on may 11-20  She needs medicine for malaria and typhoid  She is otherwise up to date on all her immunizations    She would like some nausea medicine        Review of Systems    Objective   Physical Exam  Vitals and nursing note reviewed.   Constitutional:       General: She is not in acute distress.     Appearance: Normal appearance. She is well-developed.   Cardiovascular:      Rate and Rhythm: Normal rate and regular rhythm.      Heart sounds: Normal heart sounds.   Pulmonary:      Effort: Pulmonary effort is normal.      Breath sounds: Normal breath sounds.   Neurological:      Mental Status: She is alert and oriented to person, place, and time.   Psychiatric:         Mood and Affect: Mood normal.         Behavior: Behavior normal.         Thought Content: Thought content normal.         Judgment: Judgment normal.         Assessment & Plan   Diagnoses and all orders for this visit:    1. Travel advice encounter (Primary)  -     typhoid (Vivotif) DR capsule; Take 1 capsule by mouth Every Other Day for 7 days.  Dispense: 4 capsule; Refill: 0  -     ondansetron ODT (ZOFRAN-ODT) 8 MG disintegrating tablet; Place 1 tablet on the tongue Every 8 (Eight) Hours As Needed for Nausea or Vomiting.  Dispense: 20 tablet; Refill: 1    Other orders  -     doxycycline (ADOXA) 100 MG tablet; 1 PO QD 2 days prior to travel and then while on trip and for 28 days after returning  Dispense: 40 tablet; Refill: 0    vivotif, doxy for malaria, ok PRN zofran as recommended by CDC webiste for travel.            DC instructions

## 2023-12-08 ENCOUNTER — OFFICE VISIT (OUTPATIENT)
Dept: FAMILY MEDICINE CLINIC | Facility: CLINIC | Age: 33
End: 2023-12-08
Payer: COMMERCIAL

## 2023-12-08 VITALS
DIASTOLIC BLOOD PRESSURE: 64 MMHG | HEART RATE: 76 BPM | TEMPERATURE: 98 F | OXYGEN SATURATION: 99 % | HEIGHT: 63 IN | WEIGHT: 138.4 LBS | BODY MASS INDEX: 24.52 KG/M2 | RESPIRATION RATE: 16 BRPM | SYSTOLIC BLOOD PRESSURE: 108 MMHG

## 2023-12-08 DIAGNOSIS — F32.81 PMDD (PREMENSTRUAL DYSPHORIC DISORDER): Primary | ICD-10-CM

## 2023-12-08 PROCEDURE — 99214 OFFICE O/P EST MOD 30 MIN: CPT | Performed by: FAMILY MEDICINE

## 2023-12-08 RX ORDER — PAROXETINE 10 MG/1
10 TABLET, FILM COATED ORAL EVERY MORNING
Qty: 90 TABLET | Refills: 1 | Status: SHIPPED | OUTPATIENT
Start: 2023-12-08

## 2023-12-08 NOTE — PROGRESS NOTES
Chief Complaint  Moodiness (Having extreme mood changes before starting her period. Once she starts, all symptoms go away. Was an issue in the past, but went away. Started again within the last year.)    Subjective          Nannette Thompson presents to [facility]  History of Present Illness  The patient is a 33-year-old female who presents for evaluation of moodiness.    She had severe PMS symptoms prior to her menstrual cycles. She believed her symptoms were managed, but they have worsened in the past year. She feels as if she is 2 different people but feels normal again after menstruation begins. She becomes irritable with things that normally would not affect her. She makes a lot of life decisions during this time. She quit her job and asked for a divorce most recently. She loves her child, but becomes irritable towards them prior to menstruation. She has taken medications in the past, including BuSpar.    The following portions of the patient's history were reviewed and updated as appropriate: allergies, current medications, past family history, past medical history, past social history, past surgical history, and problem list.    Objective      Physical Exam  Vitals reviewed.   Cardiovascular:      Rate and Rhythm: Normal rate.      Heart sounds: Normal heart sounds.   Pulmonary:      Effort: Pulmonary effort is normal.      Breath sounds: Normal breath sounds.   Neurological:      Mental Status: She is alert.   Psychiatric:         Mood and Affect: Mood normal.         Behavior: Behavior normal.        Result Review :                Assessment and Plan    Diagnoses and all orders for this visit:    1. PMDD (premenstrual dysphoric disorder) (Primary)  -     PARoxetine (Paxil) 10 MG tablet; Take 1 tablet by mouth Every Morning.  Dispense: 90 tablet; Refill: 1      Premenstrual dysphoric disorder.  - Low-dose Paxil once daily prescribed, which she can take at bedtime if  drowsiness occurs.     The patient will follow up in 2 to 3 months.      Follow Up   Return in about 3 months (around 3/8/2024) for Recheck.  Patient was given instructions and counseling regarding her condition or for health maintenance advice. Please see specific information pulled into the AVS if appropriate.     Transcribed from ambient dictation for Mee Moulton DO by Karon Henson.  12/09/23   18:08 EST    Patient or patient representative verbalized consent to the visit recording.  I have personally performed the services described in this document as transcribed by the above individual, and it is both accurate and complete.

## 2024-05-13 ENCOUNTER — OFFICE VISIT (OUTPATIENT)
Dept: FAMILY MEDICINE CLINIC | Facility: CLINIC | Age: 34
End: 2024-05-13
Payer: COMMERCIAL

## 2024-05-13 VITALS
WEIGHT: 140.2 LBS | RESPIRATION RATE: 14 BRPM | OXYGEN SATURATION: 98 % | HEART RATE: 91 BPM | SYSTOLIC BLOOD PRESSURE: 104 MMHG | HEIGHT: 63 IN | TEMPERATURE: 98.4 F | BODY MASS INDEX: 24.84 KG/M2 | DIASTOLIC BLOOD PRESSURE: 70 MMHG

## 2024-05-13 DIAGNOSIS — Z71.84 TRAVEL ADVICE ENCOUNTER: ICD-10-CM

## 2024-05-13 DIAGNOSIS — M54.2 NECK DISCOMFORT: ICD-10-CM

## 2024-05-13 DIAGNOSIS — F32.81 PMDD (PREMENSTRUAL DYSPHORIC DISORDER): Primary | ICD-10-CM

## 2024-05-13 DIAGNOSIS — R22.1 MASS OF LEFT SIDE OF NECK: ICD-10-CM

## 2024-05-13 PROCEDURE — 99214 OFFICE O/P EST MOD 30 MIN: CPT | Performed by: FAMILY MEDICINE

## 2024-05-13 RX ORDER — DOXYCYCLINE 100 MG/1
CAPSULE ORAL
Qty: 40 CAPSULE | Refills: 0 | Status: SHIPPED | OUTPATIENT
Start: 2024-05-13

## 2024-05-13 RX ORDER — PAROXETINE 10 MG/1
10 TABLET, FILM COATED ORAL EVERY MORNING
Qty: 90 TABLET | Refills: 1 | Status: SHIPPED | OUTPATIENT
Start: 2024-05-13

## 2024-05-13 NOTE — PROGRESS NOTES
Chief Complaint  Pain (Neck pain, seeking referral//Also discussion of meds)    Subjective          Nannette Thompson presents to [facility]    History of Present Illness  The patient presents for evaluation of multiple medical concerns.  She is currently on a regimen of Paxil, which she reports as being effective.    The patient requests a prescription for doxycycline, a medication previously prescribed by Dr. Porter for an 11-day course during her last trip to [facility]. She will be leaving for another trip to [facility] in near future.     The patient has been experiencing persistent anterior left neck pain for approximately 2 years. She has previously consulted with ENT Dr. Kristyn Rivas and underwent an ultrasound, biopsy, and blood tests. She is uncertain of the specifics of the procedures but expresses a desire to have these tests repeated. She has been diagnosed with a swollen gland and has consulted with an ENT specialist. She occasionally observes a lump, but is unable to always locate it. She does not believe the lump is related to her neck condition. She has received Botox treatment for her temporomandibular joint (TMJ), which did not alleviate her neck pain. She does not believe she is clenching her jaw. She denies any irritation in her neck during range of motion.        The following portions of the patient's history were reviewed and updated as appropriate: allergies, current medications, past family history, past medical history, past social history, past surgical history, and problem list.    Objective      Physical Exam  Vitals reviewed.   Neck:        Comments: Small, soft, mobile lymph node left anterior neck  Cardiovascular:      Rate and Rhythm: Normal rate.      Heart sounds: Normal heart sounds.   Pulmonary:      Effort: Pulmonary effort is normal.      Breath sounds: Normal breath sounds.   Neurological:      Mental Status: She is alert.        Physical  Exam      Result Review :       Results               Assessment and Plan    Diagnoses and all orders for this visit:    1. PMDD (premenstrual dysphoric disorder) (Primary)  -     PARoxetine (Paxil) 10 MG tablet; Take 1 tablet by mouth Every Morning.  Dispense: 90 tablet; Refill: 1  -     CBC (No Diff)  -     Comprehensive Metabolic Panel  -     TSH+Free T4  -     Thyroid Peroxidase Antibody    2. Mass of left side of neck  -     CBC (No Diff)  -     Comprehensive Metabolic Panel  -     TSH+Free T4  -     Thyroid Peroxidase Antibody  -     CT soft tissue neck w contrast; Future    3. Neck discomfort  -     CBC (No Diff)  -     Comprehensive Metabolic Panel  -     TSH+Free T4  -     Thyroid Peroxidase Antibody  -     CT soft tissue neck w contrast; Future    4. Travel advice encounter  -     doxycycline (MONODOX) 100 MG capsule; 1 PO QD 2 days prior to travel and then while on trip and for 28 days after returning  Dispense: 40 capsule; Refill: 0      Assessment & Plan  1. Anxiety and depression.  A prescription for Paxil has been renewed.    2. Neck pain.  A CT scan of the soft tissue of the neck has been ordered. Laboratory tests have been ordered. A CT scan of the neck has been ordered. If the CT scan does not reveal any abnormalities and the patient continues to experience discomfort and pain, a referral to an ENT specialist will be considered.    3. Planning a trip to [place].  A prescription for a 15-day course of doxycycline has been issued.        Follow Up   Return in about 6 months (around 11/13/2024) for Recheck.      Patient was given instrPatient or patient representative verbalized consent for the use of Ambient Listening during the visit with  Mee Moulton DO for chart documentation. 5/13/2024  14:53 EDTuctions and counseling regarding her condition or for health maintenance advice. Please see specific information pulled into the AVS if appropriate.

## 2024-05-14 DIAGNOSIS — R79.89 ABNORMAL TSH: Primary | ICD-10-CM

## 2024-05-14 LAB
ALBUMIN SERPL-MCNC: 4.5 G/DL (ref 3.9–4.9)
ALBUMIN/GLOB SERPL: 2 {RATIO} (ref 1.2–2.2)
ALP SERPL-CCNC: 49 IU/L (ref 44–121)
ALT SERPL-CCNC: 13 IU/L (ref 0–32)
AST SERPL-CCNC: 13 IU/L (ref 0–40)
BILIRUB SERPL-MCNC: 0.4 MG/DL (ref 0–1.2)
BUN SERPL-MCNC: 9 MG/DL (ref 6–20)
BUN/CREAT SERPL: 12 (ref 9–23)
CALCIUM SERPL-MCNC: 9.6 MG/DL (ref 8.7–10.2)
CHLORIDE SERPL-SCNC: 106 MMOL/L (ref 96–106)
CO2 SERPL-SCNC: 23 MMOL/L (ref 20–29)
CREAT SERPL-MCNC: 0.76 MG/DL (ref 0.57–1)
EGFRCR SERPLBLD CKD-EPI 2021: 106 ML/MIN/1.73
ERYTHROCYTE [DISTWIDTH] IN BLOOD BY AUTOMATED COUNT: 12.5 % (ref 11.7–15.4)
GLOBULIN SER CALC-MCNC: 2.3 G/DL (ref 1.5–4.5)
GLUCOSE SERPL-MCNC: 69 MG/DL (ref 70–99)
HCT VFR BLD AUTO: 40 % (ref 34–46.6)
HGB BLD-MCNC: 13.3 G/DL (ref 11.1–15.9)
MCH RBC QN AUTO: 29.5 PG (ref 26.6–33)
MCHC RBC AUTO-ENTMCNC: 33.3 G/DL (ref 31.5–35.7)
MCV RBC AUTO: 89 FL (ref 79–97)
PLATELET # BLD AUTO: 394 X10E3/UL (ref 150–450)
POTASSIUM SERPL-SCNC: 5 MMOL/L (ref 3.5–5.2)
PROT SERPL-MCNC: 6.8 G/DL (ref 6–8.5)
RBC # BLD AUTO: 4.51 X10E6/UL (ref 3.77–5.28)
SODIUM SERPL-SCNC: 141 MMOL/L (ref 134–144)
T4 FREE SERPL-MCNC: 1.52 NG/DL (ref 0.82–1.77)
THYROPEROXIDASE AB SERPL-ACNC: 11 IU/ML (ref 0–34)
TSH SERPL DL<=0.005 MIU/L-ACNC: 0.39 UIU/ML (ref 0.45–4.5)
WBC # BLD AUTO: 6.2 X10E3/UL (ref 3.4–10.8)

## 2024-05-24 ENCOUNTER — HOSPITAL ENCOUNTER (OUTPATIENT)
Dept: CT IMAGING | Facility: HOSPITAL | Age: 34
Discharge: HOME | End: 2024-05-24
Admitting: FAMILY MEDICINE
Payer: COMMERCIAL

## 2024-05-24 DIAGNOSIS — M54.2 NECK DISCOMFORT: ICD-10-CM

## 2024-05-24 DIAGNOSIS — R22.1 MASS OF LEFT SIDE OF NECK: ICD-10-CM

## 2024-05-24 PROCEDURE — 25510000001 IOPAMIDOL 61 % SOLUTION: Performed by: FAMILY MEDICINE

## 2024-05-24 PROCEDURE — 70491 CT SOFT TISSUE NECK W/DYE: CPT

## 2024-05-24 RX ADMIN — IOPAMIDOL 85 ML: 612 INJECTION, SOLUTION INTRAVENOUS at 10:24

## 2024-06-17 ENCOUNTER — LAB (OUTPATIENT)
Dept: FAMILY MEDICINE CLINIC | Facility: CLINIC | Age: 34
End: 2024-06-17
Payer: COMMERCIAL

## 2024-06-17 DIAGNOSIS — R79.89 ABNORMAL TSH: ICD-10-CM

## 2024-06-19 DIAGNOSIS — E05.90 HYPERTHYROIDISM: Primary | ICD-10-CM

## 2024-06-19 LAB
T4 FREE SERPL-MCNC: 1.26 NG/DL (ref 0.82–1.77)
TSH SERPL DL<=0.005 MIU/L-ACNC: 0.36 UIU/ML (ref 0.45–4.5)
TSI SER-ACNC: <0.1 IU/L (ref 0–0.55)

## 2024-08-06 ENCOUNTER — OFFICE VISIT (OUTPATIENT)
Dept: ENDOCRINOLOGY | Facility: CLINIC | Age: 34
End: 2024-08-06
Payer: COMMERCIAL

## 2024-08-06 VITALS
SYSTOLIC BLOOD PRESSURE: 128 MMHG | WEIGHT: 150.6 LBS | OXYGEN SATURATION: 99 % | BODY MASS INDEX: 26.68 KG/M2 | DIASTOLIC BLOOD PRESSURE: 70 MMHG | HEIGHT: 63 IN | HEART RATE: 80 BPM

## 2024-08-06 DIAGNOSIS — E05.90 HYPERTHYROIDISM: Primary | ICD-10-CM

## 2024-08-06 DIAGNOSIS — E04.9 GOITER: ICD-10-CM

## 2024-08-06 LAB
T3 SERPL-MCNC: 119 NG/DL (ref 80–200)
T4 FREE SERPL-MCNC: 1.41 NG/DL (ref 0.92–1.68)
TSH SERPL DL<=0.05 MIU/L-ACNC: 0.63 UIU/ML (ref 0.27–4.2)

## 2024-08-06 PROCEDURE — 83520 IMMUNOASSAY QUANT NOS NONAB: CPT | Performed by: PHYSICIAN ASSISTANT

## 2024-08-06 PROCEDURE — 99204 OFFICE O/P NEW MOD 45 MIN: CPT | Performed by: PHYSICIAN ASSISTANT

## 2024-08-06 PROCEDURE — 84480 ASSAY TRIIODOTHYRONINE (T3): CPT | Performed by: PHYSICIAN ASSISTANT

## 2024-08-06 PROCEDURE — 84439 ASSAY OF FREE THYROXINE: CPT | Performed by: PHYSICIAN ASSISTANT

## 2024-08-06 PROCEDURE — 84443 ASSAY THYROID STIM HORMONE: CPT | Performed by: PHYSICIAN ASSISTANT

## 2024-08-06 NOTE — PROGRESS NOTES
"    Chief Complaint:   Nannette Thompson is a 33 y.o. female who is being seen today for hyperthyroidism. Referred by Dr. Mee Moulton.     HPI     33 y.o. female presents for consultation regarding hyperthyroidism.    She developed a painful \"bump\" upper left side of neck, inferior and medial to left ear, several years ago. The spot hurts all the time and is painful to the the touch. Swallowing does not affect/elicit the pain. She has had several ultrasounds and CT scans but nothing has been found to correspond to the painful spot.  She has had multiple thyroid ultrasounds noting a multinodular goiter. She had 3 nodules biopsied in 2019 and all were normal, per patient report. These records are not available for review. I do see in her chart 2/6/19 a procedure note for a right thyroid nodule bx, but pathology report not available.   She has had very mildly low TSH (0.3) with normal FT4 on and off for a few years.   She does not have any hyperthyroid symptoms.   No compressive symptoms.     Hx of radiation to head/neck: no  Family hx of thyroid cancer: no  Grandmother with thyroid problems - on medication, details not available    Has had hair loss - has lost half of her hair. COVID-19 in 2020. No recent pregnancy. Has been under a lot of srtess. Son is 10 yo.     The following portions of the patient's history were reviewed and updated as appropriate: allergies, current medications, past family history, past medical history, past social history, past surgical history and problem list.    Review of Systems  Review of Systems   Musculoskeletal:         Pain left neck, chronic   Skin:         Hair loss   All other systems reviewed and are negative.       Current medications:  Current Outpatient Medications   Medication Sig Dispense Refill    PARoxetine (Paxil) 10 MG tablet Take 1 tablet by mouth Every Morning. 90 tablet 1     No current facility-administered medications for this visit.       Physical Exam "   Vitals:    08/06/24 1033   BP: 128/70   Pulse: 80   SpO2: 99%   Body mass index is 26.68 kg/m².  Physical Exam  Constitutional:       General: She is not in acute distress.     Appearance: She is well-developed. She is not diaphoretic.   Eyes:      General: Lids are normal.   Neck:      Thyroid: Thyromegaly present. No thyroid mass.      Vascular: No JVD.      Trachea: No tracheal deviation.   Cardiovascular:      Rate and Rhythm: Normal rate and regular rhythm.   Pulmonary:      Effort: Pulmonary effort is normal.      Breath sounds: Normal breath sounds.   Musculoskeletal:         General: No tenderness.   Lymphadenopathy:      Cervical: No cervical adenopathy.   Skin:     General: Skin is warm and dry.      Findings: No erythema or rash.   Neurological:      Mental Status: She is alert and oriented to person, place, and time.   Psychiatric:         Speech: Speech normal.         Behavior: Behavior normal.         Thought Content: Thought content normal.         Labs and Imaging   Lab Results   Component Value Date    TSH 0.365 (L) 06/17/2024    THYROIDAB 11 05/13/2024           Reviewed labs:  6/17/24: TSI <0.1, TPO 11 (wnl), TSH 0.365 (reference range 0.45-4.5), FT4 1.26    CT Soft Tissue Neck With Contrast (05/24/2024 10:24)   CT Soft Tissue Neck With Contrast (12/29/2021 12:01)     Assessment / Plan     Diagnoses and all orders for this visit:    1. Hyperthyroidism (Primary)  -     TSH  -     T4, Free  -     T3  -     Thyrotropin Receptor Antibody    2. Goiter        Problem List Items Addressed This Visit    None  Visit Diagnoses       Hyperthyroidism  (Chronic)   -  Primary    Relevant Orders    TSH    T4, Free    T3    Thyrotropin Receptor Antibody    Goiter              Diagnosis was discussed and reviewed with the patient including the advantages of drug therapy.        Etiology of her hyperthyroidism is likely toxic multinodular goiter given normal TPO and TSI abs as well as TSH that fluctuates between  normal and mildly low. Will also check TRAb but expect it to come back normal. Thyromegaly on exam. Based on her last neck CT it notes extensive multinodular goiter that is stable but does note some tracheal and esophageal deviation. I will have patient have thyroid ultrasound with Dr. Melendrez to assess the thyroid structurally. If it is impeding other structures in the neck then the best option would be thyroidectomy. If structurally ok then it would be reasonable to watch given that she is asymptomatic and thyroid labs are minimally abnormal.   The pain in the upper left neck does not seem to correlate with the thyroid.     Signed by: Cesar Morgan PA-C  Endocrinology  08/06/2024

## 2024-08-06 NOTE — LETTER
"August 6, 2024       No Recipients    Patient: Nnanette Thompson   YOB: 1990   Date of Visit: 8/6/2024     Dear Mee Moulton, DO:       Thank you for referring Nannette Thompson to me for evaluation. Below are the relevant portions of my assessment and plan of care.    If you have questions, please do not hesitate to call me. I look forward to following Nannette along with you.         Sincerely,        Cesar Morgan PA-C        CC:   No Recipients    Cesar Morgan PA-C  08/06/24 1305  Sign when Signing Visit      Chief Complaint:   Nannette Thompson is a 33 y.o. female who is being seen today for hyperthyroidism. Referred by Dr. Mee Moulton.     HPI     33 y.o. female presents for consultation regarding hyperthyroidism.    She developed a painful \"bump\" upper left side of neck, inferior and medial to left ear, several years ago. The spot hurts all the time and is painful to the the touch. Swallowing does not affect/elicit the pain. She has had several ultrasounds and CT scans but nothing has been found to correspond to the painful spot.  She has had multiple thyroid ultrasounds noting a multinodular goiter. She had 3 nodules biopsied in 2019 and all were normal, per patient report. These records are not available for review. I do see in her chart 2/6/19 a procedure note for a right thyroid nodule bx, but pathology report not available.   She has had very mildly low TSH (0.3) with normal FT4 on and off for a few years.   She does not have any hyperthyroid symptoms.   No compressive symptoms.     Hx of radiation to head/neck: no  Family hx of thyroid cancer: no  Grandmother with thyroid problems - on medication, details not available    Has had hair loss - has lost half of her hair. COVID-19 in 2020. No recent pregnancy. Has been under a lot of srtess. Son is 10 yo.     The following portions of the patient's history were reviewed and updated as appropriate: allergies, " current medications, past family history, past medical history, past social history, past surgical history and problem list.    Review of Systems  Review of Systems   Musculoskeletal:         Pain left neck, chronic   Skin:         Hair loss   All other systems reviewed and are negative.       Current medications:  Current Outpatient Medications   Medication Sig Dispense Refill   • PARoxetine (Paxil) 10 MG tablet Take 1 tablet by mouth Every Morning. 90 tablet 1     No current facility-administered medications for this visit.       Physical Exam   Vitals:    08/06/24 1033   BP: 128/70   Pulse: 80   SpO2: 99%   Body mass index is 26.68 kg/m².  Physical Exam  Constitutional:       General: She is not in acute distress.     Appearance: She is well-developed. She is not diaphoretic.   Eyes:      General: Lids are normal.   Neck:      Thyroid: Thyromegaly present. No thyroid mass.      Vascular: No JVD.      Trachea: No tracheal deviation.   Cardiovascular:      Rate and Rhythm: Normal rate and regular rhythm.   Pulmonary:      Effort: Pulmonary effort is normal.      Breath sounds: Normal breath sounds.   Musculoskeletal:         General: No tenderness.   Lymphadenopathy:      Cervical: No cervical adenopathy.   Skin:     General: Skin is warm and dry.      Findings: No erythema or rash.   Neurological:      Mental Status: She is alert and oriented to person, place, and time.   Psychiatric:         Speech: Speech normal.         Behavior: Behavior normal.         Thought Content: Thought content normal.         Labs and Imaging   Lab Results   Component Value Date    TSH 0.365 (L) 06/17/2024    THYROIDAB 11 05/13/2024           Reviewed labs:  6/17/24: TSI <0.1, TPO 11 (wnl), TSH 0.365 (reference range 0.45-4.5), FT4 1.26    CT Soft Tissue Neck With Contrast (05/24/2024 10:24)   CT Soft Tissue Neck With Contrast (12/29/2021 12:01)     Assessment / Plan     Diagnoses and all orders for this visit:    1. Hyperthyroidism  (Primary)  -     TSH  -     T4, Free  -     T3  -     Thyrotropin Receptor Antibody    2. Goiter        Problem List Items Addressed This Visit    None  Visit Diagnoses       Hyperthyroidism  (Chronic)   -  Primary    Relevant Orders    TSH    T4, Free    T3    Thyrotropin Receptor Antibody    Goiter              Diagnosis was discussed and reviewed with the patient including the advantages of drug therapy.        Etiology of her hyperthyroidism is likely toxic multinodular goiter given normal TPO and TSI abs as well as TSH that fluctuates between normal and mildly low. Will also check TRAb but expect it to come back normal. Thyromegaly on exam. Based on her last neck CT it notes extensive multinodular goiter that is stable but does note some tracheal and esophageal deviation. I will have patient have thyroid ultrasound with Dr. Melendrez to assess the thyroid structurally. If it is impeding other structures in the neck then the best option would be thyroidectomy. If structurally ok then it would be reasonable to watch given that she is asymptomatic and thyroid labs are minimally abnormal.   The pain in the upper left neck does not seem to correlate with the thyroid.     Signed by: Cesar Morgan PA-C  Endocrinology  08/06/2024

## 2024-08-07 LAB — TSH RECEP AB SER-ACNC: <1.1 IU/L (ref 0–1.75)
